# Patient Record
Sex: FEMALE | Race: WHITE | NOT HISPANIC OR LATINO | Employment: UNEMPLOYED | URBAN - METROPOLITAN AREA
[De-identification: names, ages, dates, MRNs, and addresses within clinical notes are randomized per-mention and may not be internally consistent; named-entity substitution may affect disease eponyms.]

---

## 2019-10-02 PROBLEM — Z34.90 ENCOUNTER FOR ELECTIVE INDUCTION OF LABOR: Status: ACTIVE | Noted: 2019-10-02

## 2024-09-04 ENCOUNTER — TELEPHONE (OUTPATIENT)
Dept: MATERNAL FETAL MEDICINE | Facility: CLINIC | Age: 34
End: 2024-09-04
Payer: COMMERCIAL

## 2024-09-10 ENCOUNTER — OFFICE VISIT (OUTPATIENT)
Dept: MATERNAL FETAL MEDICINE | Facility: CLINIC | Age: 34
End: 2024-09-10
Payer: COMMERCIAL

## 2024-09-10 ENCOUNTER — PROCEDURE VISIT (OUTPATIENT)
Dept: MATERNAL FETAL MEDICINE | Facility: CLINIC | Age: 34
End: 2024-09-10
Payer: COMMERCIAL

## 2024-09-10 VITALS
HEART RATE: 85 BPM | BODY MASS INDEX: 38.96 KG/M2 | DIASTOLIC BLOOD PRESSURE: 69 MMHG | WEIGHT: 198.44 LBS | SYSTOLIC BLOOD PRESSURE: 120 MMHG | HEIGHT: 60 IN

## 2024-09-10 DIAGNOSIS — O28.3 ABNORMAL PRENATAL ULTRASOUND: ICD-10-CM

## 2024-09-10 DIAGNOSIS — Z36.2 ENCOUNTER FOR FOLLOW-UP ULTRASOUND OF FETAL ANATOMY: Primary | ICD-10-CM

## 2024-09-10 DIAGNOSIS — Z03.73 SUSPECTED FETAL ANOMALY NOT FOUND: ICD-10-CM

## 2024-09-10 PROCEDURE — 99999 PR PBB SHADOW E&M-EST. PATIENT-LVL III: CPT | Mod: PBBFAC,,, | Performed by: STUDENT IN AN ORGANIZED HEALTH CARE EDUCATION/TRAINING PROGRAM

## 2024-09-10 NOTE — ASSESSMENT & PLAN NOTE
Carmen presents today for consultation secondary to concern for abnormal fetal cardiac axis. Fetal imaging is incomplete today most notably for cardiac views including 4 chamber, septum, and LVOT however no abnormalities are visualized in the imaging that was completed today.  Given her gestational age, we will repeat imaging for follow up anatomy and cardiac views in 2 weeks.   The fetal growth is appropriate for gestational age and no microcephaly was noted. All fetal biometry measurements are approximately within 2 weeks of stated GUNNER    Recommendations  Follow up MFM visit and completion of anatomy in 2 weeks

## 2024-09-10 NOTE — PROGRESS NOTES
MATERNAL-FETAL MEDICINE   CONSULT NOTE    Provider requesting consultation: MD Annie  SUBJECTIVE:   Ms. Carmen Benson is a 33 y.o.  female with IUP at 23w6d who is seen in consultation by MFM for evaluation and management of:  Problem   Suspected Fetal Anomaly Not Found        Review of patient's allergies indicates:  No Known Allergies  OB History    Para Term  AB Living   3 2 2     2   SAB IAB Ectopic Multiple Live Births         0 2      # Outcome Date GA Lbr Wagner/2nd Weight Sex Type Anes PTL Lv   3 Current            2 Term 10/02/19 39w0d / 00:08 3.175 kg (7 lb) F Vag-Spont EPI N DES   1 Term      Vag-Spont   DES     No past medical history on file.  No past surgical history on file.  Family history: negative for birth defects, recurrent miscarriages, chromosomal abnormalities.   Social History     Tobacco Use    Smoking status: Never    Smokeless tobacco: Never   Substance Use Topics    Alcohol use: Not Currently    Drug use: Never     Review of patient's allergies indicates:  No Known Allergies  Objective:   /69 (BP Location: Left arm, Patient Position: Lying)   Pulse 85   Ht 5' (1.524 m)   Wt 90 kg (198 lb 6.6 oz)   BMI 38.75 kg/m²   Ultrasound performed. See viewpoint for full ultrasound report.  A detailed fetal anatomic ultrasound examination was performed for the following high risk indication: suspected abnormality in cardiac axis.   No fetal structural malformations are identified; however, fetal imaging is incomplete today.   A follow-up study will be scheduled to complete the fetal anatomic survey.   Fetal size today is consistent with established gestational age.   Placental location is anterior without evidence of previa.   ASSESSMENT/PLAN:     33 y.o.  female with IUP at 23w6d     Suspected fetal anomaly not found  Carmen presents today for consultation secondary to concern for abnormal fetal cardiac axis. Fetal imaging is incomplete today most notably  for cardiac views including 4 chamber, septum, and LVOT however no abnormalities are visualized in the imaging that was completed today.  Given her gestational age, we will repeat imaging for follow up anatomy and cardiac views in 2 weeks.   The fetal growth is appropriate for gestational age and no microcephaly was noted. All fetal biometry measurements are approximately within 2 weeks of stated GUNNER    Recommendations  Follow up MFM visit and completion of anatomy in 2 weeks    FOLLOW UP:   F/u in 2 weeks for US/MFM visit      Shakeel Levy  Maternal-Fetal Medicine    Electronically Signed by Shakeel Levy September 10, 2024

## 2024-09-25 ENCOUNTER — OFFICE VISIT (OUTPATIENT)
Dept: MATERNAL FETAL MEDICINE | Facility: CLINIC | Age: 34
End: 2024-09-25
Payer: COMMERCIAL

## 2024-09-25 ENCOUNTER — PROCEDURE VISIT (OUTPATIENT)
Dept: MATERNAL FETAL MEDICINE | Facility: CLINIC | Age: 34
End: 2024-09-25
Payer: COMMERCIAL

## 2024-09-25 VITALS
BODY MASS INDEX: 40.69 KG/M2 | WEIGHT: 207.25 LBS | SYSTOLIC BLOOD PRESSURE: 125 MMHG | DIASTOLIC BLOOD PRESSURE: 67 MMHG | HEIGHT: 60 IN | HEART RATE: 81 BPM

## 2024-09-25 DIAGNOSIS — Z03.73 SUSPECTED FETAL ANOMALY NOT FOUND: Primary | ICD-10-CM

## 2024-09-25 DIAGNOSIS — Z03.73 SUSPECTED FETAL ANOMALY NOT FOUND: ICD-10-CM

## 2024-09-25 DIAGNOSIS — Z36.2 ENCOUNTER FOR FOLLOW-UP ULTRASOUND OF FETAL ANATOMY: ICD-10-CM

## 2024-09-25 PROCEDURE — 99999 PR PBB SHADOW E&M-EST. PATIENT-LVL III: CPT | Mod: PBBFAC,,, | Performed by: STUDENT IN AN ORGANIZED HEALTH CARE EDUCATION/TRAINING PROGRAM

## 2024-09-25 PROCEDURE — 76816 OB US FOLLOW-UP PER FETUS: CPT | Mod: S$GLB,,, | Performed by: STUDENT IN AN ORGANIZED HEALTH CARE EDUCATION/TRAINING PROGRAM

## 2024-09-25 NOTE — PROGRESS NOTES
Maternal Fetal Medicine Follow up  SUBJECTIVE:     Carmen Benson is a 33 y.o.  female with IUP at 26w0d who is seen for MF follow up for management of:    Problem   Suspected Fetal Anomaly Not Found     Previous notes reviewed.   No changes to medical, surgical, family, social, or obstetric history.      Review of patient's allergies indicates:  No Known Allergies  Care team members:  MD Annie - Primary OB  OBJECTIVE:   Blood Pressure: /67 (BP Location: Left arm, Patient Position: Sitting)   Pulse 81   Ht 5' (1.524 m)   Wt 94 kg (207 lb 3.7 oz)   BMI 40.47 kg/m²   Ultrasound performed. See viewpoint for full ultrasound report.  The fetal anatomic survey was completed today, and no fetal structural abnormalities were identified of the structures imaged today.    The AFV is normal.   ASSESSMENT/PLAN:     33 y.o.  female with IUP at 26w0d presents for MFM follow up.    Suspected fetal anomaly not found  Carmen presents today for a follow up for cardiac imaging which appears normal. The anatomy was completed today. As it has been two weeks since her last visit, a repeat growth was not completed. This can be completed with Dr. Valencia however no evidence of FGR or microcephaly on her initial scan.    Recommendations  Out of abundance of precaution, she has been scheduled with [facility] for her 32 week assessment.    FOLLOW UP:   32 wk       Shakeel Levy  Maternal-Fetal Medicine    Electronically Signed by Shakeel Levy 2024

## 2024-09-25 NOTE — ASSESSMENT & PLAN NOTE
Carmen presents today for a follow up for cardiac imaging which appears normal. The anatomy was completed today. As it has been two weeks since her last visit, a repeat growth was not completed. This can be completed with Dr. Valencia however no evidence of FGR or microcephaly on her initial scan.    Recommendations  Out of abundance of precaution, she has been scheduled with [facility] for her 32 week assessment.

## 2024-11-07 ENCOUNTER — PROCEDURE VISIT (OUTPATIENT)
Dept: MATERNAL FETAL MEDICINE | Facility: CLINIC | Age: 34
End: 2024-11-07
Payer: COMMERCIAL

## 2024-11-07 DIAGNOSIS — Z36.89 ENCOUNTER FOR ULTRASOUND TO ASSESS FETAL GROWTH: ICD-10-CM

## 2024-11-07 PROCEDURE — 76816 OB US FOLLOW-UP PER FETUS: CPT | Mod: S$GLB,,, | Performed by: STUDENT IN AN ORGANIZED HEALTH CARE EDUCATION/TRAINING PROGRAM
